# Patient Record
Sex: FEMALE | Race: WHITE | NOT HISPANIC OR LATINO | URBAN - NONMETROPOLITAN AREA
[De-identification: names, ages, dates, MRNs, and addresses within clinical notes are randomized per-mention and may not be internally consistent; named-entity substitution may affect disease eponyms.]

---

## 2022-06-07 ENCOUNTER — OFFICE VISIT (OUTPATIENT)
Dept: FAMILY MEDICINE CLINIC | Facility: CLINIC | Age: 47
End: 2022-06-07

## 2022-06-07 VITALS
RESPIRATION RATE: 16 BRPM | DIASTOLIC BLOOD PRESSURE: 80 MMHG | TEMPERATURE: 97.9 F | HEART RATE: 82 BPM | SYSTOLIC BLOOD PRESSURE: 140 MMHG

## 2022-06-07 DIAGNOSIS — M79.672 LEFT FOOT PAIN: Primary | ICD-10-CM

## 2022-06-07 PROCEDURE — 99203 OFFICE O/P NEW LOW 30 MIN: CPT | Performed by: NURSE PRACTITIONER

## 2022-06-07 RX ORDER — METHYLPREDNISOLONE ACETATE 40 MG/ML
INJECTION, SUSPENSION INTRA-ARTICULAR; INTRALESIONAL; INTRAMUSCULAR; SOFT TISSUE
COMMUNITY
Start: 2013-04-23

## 2022-06-07 RX ORDER — PREDNISONE 10 MG/1
TABLET ORAL
COMMUNITY

## 2022-06-07 RX ORDER — GABAPENTIN 600 MG/1
1200 TABLET ORAL 3 TIMES DAILY
COMMUNITY
Start: 2022-05-17

## 2022-06-07 RX ORDER — ONDANSETRON 4 MG/1
TABLET, ORALLY DISINTEGRATING ORAL
COMMUNITY
Start: 2022-05-19

## 2022-06-09 PROBLEM — M79.672 LEFT FOOT PAIN: Status: ACTIVE | Noted: 2022-06-09

## 2022-06-09 PROBLEM — M21.379 FOOT-DROP: Status: ACTIVE | Noted: 2017-08-23

## 2022-06-09 PROBLEM — G43.119 INTRACTABLE MIGRAINE WITH AURA WITHOUT STATUS MIGRAINOSUS: Status: ACTIVE | Noted: 2017-05-25

## 2022-06-09 PROBLEM — N31.9 NEUROGENIC DYSFUNCTION OF THE URINARY BLADDER: Status: ACTIVE | Noted: 2018-02-08

## 2022-06-09 PROBLEM — G61.81 CHRONIC INFLAMMATORY DEMYELINATING POLYNEUROPATHY: Status: ACTIVE | Noted: 2018-02-08

## 2022-06-09 PROBLEM — G56.00 CARPAL TUNNEL SYNDROME: Status: ACTIVE | Noted: 2018-10-29

## 2022-06-09 NOTE — ASSESSMENT & PLAN NOTE
Ice, elevate, take ibuprofen or Aleve as needed for pain.  We discussed that this will be self-limiting.

## 2022-06-09 NOTE — PROGRESS NOTES
"Chief Complaint  Foot Injury (Pt fell on 5/30 and fx her 5th metatarsal of her L foot.  She has a HX of neurological disorders (CIDP and foot drop) and had problems with mobility prior to this injury.  She wears braces and walks with a cane.  Since her injury, she has been in a wheelchair.  Wants to have [facility] paperwork completed and possibly a note for her to work from home.)    Subjective        Gwendolyn MURRAY presents to [facility]  History of Present Illness Michelle fell about 10 days ago and hurt her foot.  She has a history of foot drop and falling is a problem for her.  She has been seeing her x-rays are negative however she is not able to work her regular job which requires her to walk and stand so she would like paperwork filled out for her job.  The paperwork she brought me today is for a work accommodation.  She would benefit from working at home which is what she would like to do.    Objective   Vital Signs:  /80 (BP Location: Left arm, Patient Position: Sitting, Cuff Size: Adult)   Pulse 82   Temp 97.9 °F (36.6 °C) (Infrared)   Resp 16   Estimated body mass index is 26.57 kg/m² as calculated from the following:    Height as of 4/23/13: 160 cm (63\").    Weight as of 4/23/13: 68 kg (150 lb).          Physical Exam  Constitutional:       Appearance: Normal appearance.   Musculoskeletal:         General: Swelling, tenderness and signs of injury present.      Comments: Left foot is slightly swollen and bruised both of this is minimally.  She has good color temperature and 2+ pedal pulses.  She has range of motion of this foot and toes as much as she ever has given her neurological condition.   Neurological:      General: No focal deficit present.      Mental Status: She is alert and oriented to person, place, and time.        Result Review :                Assessment and Plan   Diagnoses and all orders for this visit:    1. Left foot pain (Primary)  Assessment & " Plan:  Ice, elevate, take ibuprofen or Aleve as needed for pain.  We discussed that this will be self-limiting.             Follow Up   Return in about 2 weeks (around 6/21/2022), or if symptoms worsen or fail to improve.  Patient was given instructions and counseling regarding her condition or for health maintenance advice. Please see specific information pulled into the AVS if appropriate.